# Patient Record
Sex: MALE | Race: WHITE | NOT HISPANIC OR LATINO | Employment: FULL TIME | URBAN - NONMETROPOLITAN AREA
[De-identification: names, ages, dates, MRNs, and addresses within clinical notes are randomized per-mention and may not be internally consistent; named-entity substitution may affect disease eponyms.]

---

## 2020-07-29 ENCOUNTER — HOSPITAL ENCOUNTER (EMERGENCY)
Facility: HOSPITAL | Age: 25
Discharge: HOME | End: 2020-07-29
Admitting: FAMILY MEDICINE

## 2020-07-29 ENCOUNTER — APPOINTMENT (OUTPATIENT)
Dept: GENERAL RADIOLOGY | Facility: HOSPITAL | Age: 25
End: 2020-07-29

## 2020-07-29 VITALS
HEIGHT: 68 IN | SYSTOLIC BLOOD PRESSURE: 133 MMHG | WEIGHT: 173 LBS | BODY MASS INDEX: 26.22 KG/M2 | RESPIRATION RATE: 16 BRPM | OXYGEN SATURATION: 99 % | DIASTOLIC BLOOD PRESSURE: 78 MMHG | TEMPERATURE: 98.2 F | HEART RATE: 54 BPM

## 2020-07-29 DIAGNOSIS — S46.911A STRAIN OF RIGHT SHOULDER, INITIAL ENCOUNTER: Primary | ICD-10-CM

## 2020-07-29 PROCEDURE — 73030 X-RAY EXAM OF SHOULDER: CPT

## 2020-07-29 PROCEDURE — 99283 EMERGENCY DEPT VISIT LOW MDM: CPT

## 2020-07-29 PROCEDURE — 25010000002 METHYLPREDNISOLONE PER 125 MG: Performed by: PHYSICIAN ASSISTANT

## 2020-07-29 PROCEDURE — 73030 X-RAY EXAM OF SHOULDER: CPT | Performed by: RADIOLOGY

## 2020-07-29 PROCEDURE — 96372 THER/PROPH/DIAG INJ SC/IM: CPT

## 2020-07-29 RX ORDER — METHYLPREDNISOLONE SODIUM SUCCINATE 125 MG/2ML
80 INJECTION, POWDER, LYOPHILIZED, FOR SOLUTION INTRAMUSCULAR; INTRAVENOUS ONCE
Status: COMPLETED | OUTPATIENT
Start: 2020-07-29 | End: 2020-07-29

## 2020-07-29 RX ORDER — METHYLPREDNISOLONE 4 MG/1
TABLET ORAL
Qty: 21 TABLET | Refills: 0 | Status: SHIPPED | OUTPATIENT
Start: 2020-07-29

## 2020-07-29 RX ADMIN — METHYLPREDNISOLONE SODIUM SUCCINATE 80 MG: 125 INJECTION, POWDER, FOR SOLUTION INTRAMUSCULAR; INTRAVENOUS at 21:01

## 2020-11-17 ENCOUNTER — OFFICE VISIT (OUTPATIENT)
Dept: UROLOGY | Facility: CLINIC | Age: 25
End: 2020-11-17

## 2020-11-17 VITALS — TEMPERATURE: 98.4 F | BODY MASS INDEX: 26.22 KG/M2 | WEIGHT: 173 LBS | HEIGHT: 68 IN

## 2020-11-17 DIAGNOSIS — R31.29 MICROSCOPIC HEMATURIA: ICD-10-CM

## 2020-11-17 DIAGNOSIS — N48.89 PENILE SWELLING: Primary | ICD-10-CM

## 2020-11-17 DIAGNOSIS — R10.9 BILATERAL FLANK PAIN: ICD-10-CM

## 2020-11-17 DIAGNOSIS — R10.32 BILATERAL GROIN PAIN: ICD-10-CM

## 2020-11-17 DIAGNOSIS — R30.0 DYSURIA: ICD-10-CM

## 2020-11-17 DIAGNOSIS — R10.31 BILATERAL GROIN PAIN: ICD-10-CM

## 2020-11-17 DIAGNOSIS — R21 PENILE RASH: ICD-10-CM

## 2020-11-17 LAB
BILIRUB BLD-MCNC: NEGATIVE MG/DL
CLARITY, POC: CLEAR
COLOR UR: YELLOW
GLUCOSE UR STRIP-MCNC: NEGATIVE MG/DL
KETONES UR QL: NEGATIVE
LEUKOCYTE EST, POC: NEGATIVE
NITRITE UR-MCNC: NEGATIVE MG/ML
PH UR: 7 [PH] (ref 5–8)
PROT UR STRIP-MCNC: NEGATIVE MG/DL
RBC # UR STRIP: ABNORMAL /UL
SP GR UR: 1.01 (ref 1–1.03)
UROBILINOGEN UR QL: NORMAL

## 2020-11-17 PROCEDURE — 87086 URINE CULTURE/COLONY COUNT: CPT | Performed by: NURSE PRACTITIONER

## 2020-11-17 PROCEDURE — 51798 US URINE CAPACITY MEASURE: CPT | Performed by: NURSE PRACTITIONER

## 2020-11-17 PROCEDURE — 99214 OFFICE O/P EST MOD 30 MIN: CPT | Performed by: NURSE PRACTITIONER

## 2020-11-17 RX ORDER — CLOTRIMAZOLE AND BETAMETHASONE DIPROPIONATE 10; .64 MG/G; MG/G
CREAM TOPICAL 2 TIMES DAILY PRN
Qty: 45 G | Refills: 1 | Status: SHIPPED | OUTPATIENT
Start: 2020-11-17

## 2020-11-17 RX ORDER — TAMSULOSIN HYDROCHLORIDE 0.4 MG/1
1 CAPSULE ORAL DAILY
Qty: 30 CAPSULE | Refills: 5 | Status: SHIPPED | OUTPATIENT
Start: 2020-11-17

## 2020-11-17 NOTE — PATIENT INSTRUCTIONS
"Dietary Guidelines to Help Prevent Kidney Stones  Kidney stones are deposits of minerals and salts that form inside your kidneys. Your risk of developing kidney stones may be greater depending on your diet, your lifestyle, the medicines you take, and whether you have certain medical conditions. Most people can reduce their chances of developing kidney stones by following the instructions below. Depending on your overall health and the type of kidney stones you tend to develop, your dietitian may give you more specific instructions.  What are tips for following this plan?  Reading food labels  · Choose foods with \"no salt added\" or \"low-salt\" labels. Limit your sodium intake to less than 1500 mg per day.  · Choose foods with calcium for each meal and snack. Try to eat about 300 mg of calcium at each meal. Foods that contain 200-500 mg of calcium per serving include:  ? 8 oz (237 ml) of milk, fortified nondairy milk, and fortified fruit juice.  ? 8 oz (237 ml) of kefir, yogurt, and soy yogurt.  ? 4 oz (118 ml) of tofu.  ? 1 oz of cheese.  ? 1 cup (300 g) of dried figs.  ? 1 cup (91 g) of cooked broccoli.  ? 1-3 oz can of sardines or mackerel.  · Most people need 1000 to 1500 mg of calcium each day. Talk to your dietitian about how much calcium is recommended for you.  Shopping  · Buy plenty of fresh fruits and vegetables. Most people do not need to avoid fruits and vegetables, even if they contain nutrients that may contribute to kidney stones.  · When shopping for convenience foods, choose:  ? Whole pieces of fruit.  ? Premade salads with dressing on the side.  ? Low-fat fruit and yogurt smoothies.  · Avoid buying frozen meals or prepared deli foods.  · Look for foods with live cultures, such as yogurt and kefir.  Cooking  · Do not add salt to food when cooking. Place a salt shaker on the table and allow each person to add his or her own salt to taste.  · Use vegetable protein, such as beans, textured vegetable " protein (TVP), or tofu instead of meat in pasta, casseroles, and soups.  Meal planning    · Eat less salt, if told by your dietitian. To do this:  ? Avoid eating processed or premade food.  ? Avoid eating fast food.  · Eat less animal protein, including cheese, meat, poultry, or fish, if told by your dietitian. To do this:  ? Limit the number of times you have meat, poultry, fish, or cheese each week. Eat a diet free of meat at least 2 days a week.  ? Eat only one serving each day of meat, poultry, fish, or seafood.  ? When you prepare animal protein, cut pieces into small portion sizes. For most meat and fish, one serving is about the size of one deck of cards.  · Eat at least 5 servings of fresh fruits and vegetables each day. To do this:  ? Keep fruits and vegetables on hand for snacks.  ? Eat 1 piece of fruit or a handful of berries with breakfast.  ? Have a salad and fruit at lunch.  ? Have two kinds of vegetables at dinner.  · Limit foods that are high in a substance called oxalate. These include:  ? Spinach.  ? Rhubarb.  ? Beets.  ? Potato chips and french fries.  ? Nuts.  · If you regularly take a diuretic medicine, make sure to eat at least 1-2 fruits or vegetables high in potassium each day. These include:  ? Avocado.  ? Banana.  ? Orange, prune, carrot, or tomato juice.  ? Baked potato.  ? Cabbage.  ? Beans and split peas.  General instructions    · Drink enough fluid to keep your urine clear or pale yellow. This is the most important thing you can do.  · Talk to your health care provider and dietitian about taking daily supplements. Depending on your health and the cause of your kidney stones, you may be advised:  ? Not to take supplements with vitamin C.  ? To take a calcium supplement.  ? To take a daily probiotic supplement.  ? To take other supplements such as magnesium, fish oil, or vitamin B6.  · Take all medicines and supplements as told by your health care provider.  · Limit alcohol intake to no  more than 1 drink a day for nonpregnant women and 2 drinks a day for men. One drink equals 12 oz of beer, 5 oz of wine, or 1½ oz of hard liquor.  · Lose weight if told by your health care provider. Work with your dietitian to find strategies and an eating plan that works best for you.  What foods are not recommended?  Limit your intake of the following foods, or as told by your dietitian. Talk to your dietitian about specific foods you should avoid based on the type of kidney stones and your overall health.  Grains  Breads. Bagels. Rolls. Baked goods. Salted crackers. Cereal. Pasta.  Vegetables  Spinach. Rhubarb. Beets. Canned vegetables. Pickles. Olives.  Meats and other protein foods  Nuts. Nut butters. Large portions of meat, poultry, or fish. Salted or cured meats. Deli meats. Hot dogs. Sausages.  Dairy  Cheese.  Beverages  Regular soft drinks. Regular vegetable juice.  Seasonings and other foods  Seasoning blends with salt. Salad dressings. Canned soups. Soy sauce. Ketchup. Barbecue sauce. Canned pasta sauce. Casseroles. Pizza. Lasagna. Frozen meals. Potato chips. French fries.  Summary  · You can reduce your risk of kidney stones by making changes to your diet.  · The most important thing you can do is drink enough fluid. You should drink enough fluid to keep your urine clear or pale yellow.  · Ask your health care provider or dietitian how much protein from animal sources you should eat each day, and also how much salt and calcium you should have each day.  This information is not intended to replace advice given to you by your health care provider. Make sure you discuss any questions you have with your health care provider.  Document Released: 04/13/2012 Document Revised: 04/08/2020 Document Reviewed: 11/28/2017  [employer] Patient Education © 2020 [employer]      Balanitis    Balanitis is swelling and irritation (inflammation) of the head of the penis (glans penis). The condition may also cause  inflammation of the skin around the glans penis (foreskin) in men who have not been circumcised. It may develop because of an infection or another medical condition.  Balanitis occurs most often among men who have not had their foreskin removed (uncircumcised men). Balanitis sometimes causes scarring of the penis or foreskin, which can require surgery. Untreated balanitis can increase the risk of penile cancer.  What are the causes?  Common causes of this condition include:  · Poor personal hygiene, especially in uncircumcised men. Not cleaning the glans penis and foreskin well can result in buildup of bacteria, viruses, and yeast, which can lead to infection and inflammation.  · Irritation and lack of air flow due to fluid (smegma) that can build up on the glans penis.  Other causes include:  · Chemical irritation from products such as soaps or shower gels (especially those that have fragrance), condoms, personal lubricants, petroleum jelly, spermicides, or fabric softeners.  · Skin conditions, such as eczema, dermatitis, and psoriasis.  · Allergies to medicines, such as tetracycline and sulfa drugs.  · Certain medical conditions, including liver cirrhosis, congestive heart failure, diabetes, and kidney disease.  · Infections, such as candidiasis, HPV (human papillomavirus), herpes simplex, gonorrhea, and syphilis.  · Severe obesity.  What increases the risk?  The following factors may make you more likely to develop this condition:  · Having diabetes. This is the most common risk factor.  · Having a tight foreskin that is difficult to pull back (retract) past the glans.  · Having sexual intercourse without using a condom.  What are the signs or symptoms?  Symptoms of this condition include:  · Discharge from under the foreskin.  · A bad smell.  · Pain or difficulty retracting the foreskin.  · Tenderness, redness, and swelling of the glans.  · A rash or sores on the glans or foreskin.  · Itchiness.  · Inability to  get an erection due to pain.  · Difficulty urinating.  · Scarring of the penis or foreskin, in some cases.  How is this diagnosed?  This condition may be diagnosed based on:  · A physical exam.  · Testing a swab of discharge to check for bacterial or fungal infection.  · Blood tests:  ? To check for viruses that can cause balanitis.  ? To check your blood sugar (glucose) level. High blood glucose could be a sign of diabetes, which can cause balanitis.  How is this treated?  Treatment for balanitis depends on the cause. Treatment may include:  · Improving personal hygiene. Your health care provider may recommend sitting in a bath of warm water that is deep enough to cover your hips and buttocks (sitz bath).  · Medicines such as:  ? Creams or ointments to reduce swelling (steroids) or to treat an infection.  ? Antibiotic medicine.  ? Antifungal medicine.  · Surgery to remove or cut the foreskin (circumcision). This may be done if you have scarring on the foreskin that makes it difficult to retract.  · Controlling other medical problems that may be causing your condition or making it worse.  Follow these instructions at home:  · Do not have sex until the condition clears up, or until your health care provider approves.  · Keep your penis clean and dry. Take sitz baths as recommended by your health care provider.  · Avoid products that irritate your skin or make symptoms worse, such as soaps and shower gels that have fragrance.  · Take over-the-counter and prescription medicines only as told by your health care provider.  ? If you were prescribed an antibiotic medicine or a cream or ointment, use it as told by your health care provider. Do not stop using your medicine, cream, or ointment even if you start to feel better.  ? Do not drive or use heavy machinery while taking prescription pain medicine.  Contact a health care provider if:  · Your symptoms get worse or do not improve with home care.  · You develop chills or  a fever.  · You have trouble urinating.  · You cannot retract your foreskin.  Get help right away if:  · You develop severe pain.  · You are unable to urinate.  Summary  · Balanitis is inflammation of the head of the penis (glans penis) caused by irritation or infection.  · Balanitis causes pain, redness, and swelling of the glans penis.  · This condition is most common among uncircumcised men who do not keep their glans penis clean and in men who have diabetes.  · Treatment may include creams or ointments.  · Good hygiene is important for prevention. This includes pulling back the foreskin when washing your penis.  This information is not intended to replace advice given to you by your health care provider. Make sure you discuss any questions you have with your health care provider.  Document Released: 05/06/2010 Document Revised: 11/30/2018 Document Reviewed: 11/06/2017  [employer] Patient Education © 2020 [employer]

## 2020-11-17 NOTE — PROGRESS NOTES
Chief Complaint:          Chief Complaint   Patient presents with   • Groin Swelling/Left Scrotal Pain     PENILE REDNESS/Irritation/Abdominal/Pelvic Pain       HPI:   25 y.o. male.  Very pleasant patient evaluated in clinic today.    He has been referred to us by his primary care Mr. Christopher Nelson PA-C from [facility] for penile redness and swelling.  Patient reports a gradual onset with redness and penile rash prominent post sexual intercourse that was intermittent, but now persistent lasting 2 to 3 days each time.      He reports dysuria, urine hesitancy, pelvic pain and suprapubic discomfort, but denies urine frequency, urgency, nocturia or any burning on urination.  Denies any penile discharge, flank pain, CVA tenderness.  He has not had episodes of gross hematuria, any chills or fevers, denies N/V/D.    The patient denies any kind of penile trauma.  He is sexually active in a monogamous relationship and denies any new sexual contacts, or lubricants.  His recent STD test is completely negative for any infections, his urine dipstick is completely negative for any infection, he has 3+ microscopic hematuria.  His IPSS score is 3    He is relatively healthy with no significant medical problems.      Past Medical History:      History reviewed. No pertinent past medical history.    The following portions of the patient's history were reviewed and updated as appropriate: allergies, current medications, past family history, past medical history, past social history, past surgical history and problem list.    Current Meds:     Current Outpatient Medications   Medication Sig Dispense Refill   • clotrimazole-betamethasone (LOTRISONE) 1-0.05 % cream Apply  topically to the appropriate area as directed 2 (Two) Times a Day As Needed (penile rash). 45 g 1   • methylPREDNISolone (MEDROL, MELANIE,) 4 MG tablet Take as directed on package instructions. 21 tablet 0   • tamsulosin (FLOMAX) 0.4 MG capsule 24 hr capsule  Take 1 capsule by mouth Daily. 30 capsule 5     No current facility-administered medications for this visit.         Allergies:      Allergies   Allergen Reactions   • Bactrim [Sulfamethoxazole-Trimethoprim] Other (See Comments)     Nose bleeds   • Ceclor [Cefaclor] Other (See Comments)     Nose bleeds        Past Surgical History:     History reviewed. No pertinent surgical history.      Social History:     Social History     Socioeconomic History   • Marital status: Single     Spouse name: Not on file   • Number of children: Not on file   • Years of education: Not on file   • Highest education level: Not on file       Family History:     History reviewed. No pertinent family history.    Review of Systems:     Review of Systems   Constitutional: Negative for activity change, appetite change, chills, fatigue and fever.   HENT: Negative for congestion and sinus pressure.    Eyes: Negative for blurred vision and double vision.   Respiratory: Negative for shortness of breath and wheezing ( left testicle pain).    Gastrointestinal: Negative for abdominal pain, constipation, diarrhea, nausea and vomiting.   Genitourinary: Positive for dysuria, hematuria, penile pain and testicular pain. Negative for difficulty urinating, discharge, flank pain, frequency, genital sores, penile swelling, scrotal swelling, urgency and urinary incontinence.   Musculoskeletal: Negative for back pain.   Neurological: Negative for dizziness, weakness and confusion.   Psychiatric/Behavioral: Positive for stress. Negative for agitation, behavioral problems and decreased concentration. The patient is nervous/anxious.       IPSS Questionnaire (AUA-7):  Over the past month…    1)  How often have you had a sensation of not emptying your bladder completely after you finish urinating?  1 - Less than 1 time in 5   2)  How often have you had to urinate again less than two hours after you finished urinating? 1 - Less than 1 time in 5   3)  How often have  you found you stopped and started again several times when you urinated?  0 - Not at all   4) How difficult have you found it to postpone urination?  0 - Not at all   5) How often have you had a weak urinary stream?  1 - Less than 1 time in 5   6) How often have you had to push or strain to begin urination?  0 - Not at all   7) How many times did you most typically get up to urinate from the time you went to bed until the time you got up in the morning?  0 - None   Total score:  0-7 mildly symptomatic                                                             3    8-19 moderately symptomatic    20-35 severely symptomatic       Physical Exam:     Physical Exam  Constitutional:       General: He is not in acute distress.     Appearance: He is well-developed.   HENT:      Head: Normocephalic and atraumatic.      Right Ear: External ear normal.      Left Ear: External ear normal.   Eyes:      General:         Right eye: No discharge.         Left eye: No discharge.      Conjunctiva/sclera: Conjunctivae normal.      Pupils: Pupils are equal, round, and reactive to light.   Neck:      Musculoskeletal: Normal range of motion and neck supple.      Thyroid: No thyromegaly.      Trachea: No tracheal deviation.   Cardiovascular:      Rate and Rhythm: Normal rate and regular rhythm.      Heart sounds: No murmur. No friction rub.   Pulmonary:      Effort: Pulmonary effort is normal. No respiratory distress.      Breath sounds: Normal breath sounds. No stridor.   Abdominal:      General: Bowel sounds are normal. There is no distension.      Palpations: Abdomen is soft.      Tenderness: There is abdominal tenderness. There is no guarding.   Genitourinary:     Penis: Normal and uncircumcised. No tenderness or discharge.       Scrotum/Testes: Normal.      Rectum: Normal. Guaiac result negative.      Comments: Normal male Genitalia without any penile redness or irritation visible .RESOLVED. GLANS PENIS/FORESKIN INTACT.  Left  testicular pain/tenderness otherwise normal external genitalia. Bilateral descended testes without any masses, left slightly swollen and hanging lower than the right testicles. There are no inguinal hernias or abnormalities noted.     Musculoskeletal: Normal range of motion.         General: Tenderness present. No deformity.   Skin:     General: Skin is warm and dry.      Capillary Refill: Capillary refill takes less than 2 seconds.      Coloration: Skin is not pale.   Neurological:      Mental Status: He is alert and oriented to person, place, and time.      Cranial Nerves: No cranial nerve deficit.      Coordination: Coordination normal.   Psychiatric:         Behavior: Behavior normal.         Thought Content: Thought content normal.         Judgment: Judgment normal.         Procedure:       Assessment/Plan:         ASSESSMENT  Penile Swelling/Redness/Groin Pain: Left Scrotal /Groin Pain: Patient presents with penile irritation, swelling, left scrotal pain and discomfort that has been ongoing for a few weeks and gradually becoming very bothersome to him.  He reports initially this did not hurt at all, but over the weeks, the irritation appears to be re-occuring and is becoming very concerning to him.      He also feels a lot of pain and discomfort  In his pelvic/suprapubic area .His scrotal exam is unremarkable , with no  significant swelling/ Enlargement of his scrotum.  Although no redness or irritation is present, patient reports a feeling of pressure at the base of his penis.  Urine dipstick is complete negative for any infection, he has 3+ microscopic hematuria.      I discussed this case with Dr. Boucher, who also reexamined the patient and is agreeable with the plan of care.         PLAN    WE sent his urine for culture, I will call him with results of any positive bacterial growth.  Patient was treated with Zithromax 2000 mg x 1 dose -His primary care.    We discussed alpha blockade, patient has been  started on Flomax 0.4 mg daily-Hesitancy/dysuria    Lotrisone cream: Apply to penile redness/irritation as needed-Balanitis, discussed perineal hygiene and to avoid irritants such as in certain soaps, lubricants.    We discussed on upper tract investigation, patient has been scheduled for CT abdomen and pelvis with and without contrast-Hematuria    We will see him in 2 weeks for follow-up, and review his CT scan report at that time.  He is agreeable to this plan    Patient reports that he is not currently experiencing any symptoms of urinary incontinence.    Patient's Body mass index is 26.3 kg/m². BMI is within normal parameters. No follow-up required..    Smoking Cessation Counseling:  Never a smoker.  Patient does not currently use any tobacco products.       Counseling was given to patient for the following topics diagnostic results including: Penile swelling/irritation, groin pain, left epididymal cyst., instructions for management as follows: Increase p.o. fluid intake, Lotrisone cream as needed, Flomax 0.4 mg daily., risk factor reductions including: Bladder irritants such as caffeine products, coffee, tea, citrusy/spicy foods. and impressions as follows: Scheduled for scrotal ultrasound.. The interim medical history and current results were reviewed.  A treatment plan with follow-up was made for Penile swelling [N48.89].         This document has been electronically signed by Griselda Cheng-Akwa, APRN November 19, 2020 15:09 EST

## 2020-11-18 LAB — BACTERIA SPEC AEROBE CULT: NO GROWTH

## 2020-11-24 ENCOUNTER — HOSPITAL ENCOUNTER (OUTPATIENT)
Dept: CT IMAGING | Facility: HOSPITAL | Age: 25
Discharge: HOME | End: 2020-11-24
Admitting: NURSE PRACTITIONER

## 2020-11-24 DIAGNOSIS — R10.32 BILATERAL GROIN PAIN: ICD-10-CM

## 2020-11-24 DIAGNOSIS — R31.29 MICROSCOPIC HEMATURIA: ICD-10-CM

## 2020-11-24 DIAGNOSIS — R10.31 BILATERAL GROIN PAIN: ICD-10-CM

## 2020-11-24 DIAGNOSIS — R10.9 BILATERAL FLANK PAIN: ICD-10-CM

## 2020-11-24 PROCEDURE — 74176 CT ABD & PELVIS W/O CONTRAST: CPT | Performed by: RADIOLOGY

## 2020-11-24 PROCEDURE — 74176 CT ABD & PELVIS W/O CONTRAST: CPT

## 2020-12-01 ENCOUNTER — OFFICE VISIT (OUTPATIENT)
Dept: UROLOGY | Facility: CLINIC | Age: 25
End: 2020-12-01

## 2020-12-01 VITALS — BODY MASS INDEX: 26.23 KG/M2 | WEIGHT: 173.06 LBS | HEIGHT: 68 IN | TEMPERATURE: 98.4 F

## 2020-12-01 DIAGNOSIS — R10.32 BILATERAL GROIN PAIN: Primary | ICD-10-CM

## 2020-12-01 DIAGNOSIS — R11.0 NAUSEA WITHOUT VOMITING: ICD-10-CM

## 2020-12-01 DIAGNOSIS — R10.31 BILATERAL GROIN PAIN: Primary | ICD-10-CM

## 2020-12-01 PROCEDURE — 99214 OFFICE O/P EST MOD 30 MIN: CPT | Performed by: NURSE PRACTITIONER

## 2020-12-01 RX ORDER — PROMETHAZINE HYDROCHLORIDE 25 MG/1
25 TABLET ORAL EVERY 6 HOURS PRN
Qty: 21 TABLET | Refills: 0 | Status: SHIPPED | OUTPATIENT
Start: 2020-12-01 | End: 2020-12-01 | Stop reason: SDUPTHER

## 2020-12-01 RX ORDER — PROMETHAZINE HYDROCHLORIDE 25 MG/1
25 TABLET ORAL EVERY 6 HOURS PRN
Qty: 21 TABLET | Refills: 0 | Status: SHIPPED | OUTPATIENT
Start: 2020-12-01

## 2020-12-01 NOTE — PROGRESS NOTES
Chief Complaint:          Chief Complaint   Patient presents with   • Groin Pain with Penile Swelling/ Redness /Abdominal Pain     2 Week Follow Up / Review CT       HPI:   25 y.o. male.  Very pleasant patient, relatively healthy with no significant medical problems, seen 2 weeks ago with multiple complaints for groin pain, penile redness and swelling, lower abdominal pain with significant pelvic/suprapubic discomfort, and urinary symptoms returns to clinic today for follow-up.    He is here to review his CT scan results which shows that there is no focal bladder mass , or significant bladder wall thickening consistent with his symptoms.  The CT however shows  moderate to large volumes of stool in his colon.    Patient reports minimal improvement in his prior symptoms as his groin pain and discomfort is persistent even though improved urinary symptoms since starting Flomax. HIS IPSS SCORE IS 6.    Nevertheless, he reports constant lower abdominal pain and discomfort on a daily basis, worse in the morning when waking up.  He reports significant nausea, abdominal bloating, and symptoms consistent with GERD.    We discussed scrotal/testicular ultrasound,    We discussed GI referral at this point.      Past Medical History:      History reviewed. No pertinent past medical history.    The following portions of the patient's history were reviewed and updated as appropriate: allergies, current medications, past family history, past medical history, past social history, past surgical history and problem list.    Current Meds:     Current Outpatient Medications   Medication Sig Dispense Refill   • clotrimazole-betamethasone (LOTRISONE) 1-0.05 % cream Apply  topically to the appropriate area as directed 2 (Two) Times a Day As Needed (penile rash). 45 g 1   • methylPREDNISolone (MEDROL, MELANIE,) 4 MG tablet Take as directed on package instructions. 21 tablet 0   • promethazine (PHENERGAN) 25 MG tablet Take 1 tablet by mouth Every  6 (Six) Hours As Needed for Nausea or Vomiting. 21 tablet 0   • tamsulosin (FLOMAX) 0.4 MG capsule 24 hr capsule Take 1 capsule by mouth Daily. 30 capsule 5     No current facility-administered medications for this visit.         Allergies:      Allergies   Allergen Reactions   • Bactrim [Sulfamethoxazole-Trimethoprim] Other (See Comments)     Nose bleeds   • Ceclor [Cefaclor] Other (See Comments)     Nose bleeds        Past Surgical History:     History reviewed. No pertinent surgical history.      Social History:     Social History     Socioeconomic History   • Marital status: Single     Spouse name: Not on file   • Number of children: Not on file   • Years of education: Not on file   • Highest education level: Not on file       Family History:     History reviewed. No pertinent family history.    Review of Systems:     Review of Systems   Constitutional: Positive for activity change and fatigue. Negative for appetite change, chills and fever.   HENT: Negative for congestion and sinus pressure.    Eyes: Negative for blurred vision and double vision.   Respiratory: Negative for shortness of breath and wheezing.    Gastrointestinal: Positive for abdominal pain, constipation, nausea, GERD and indigestion. Negative for diarrhea and vomiting.   Genitourinary: Positive for difficulty urinating, dysuria, penile swelling and testicular pain. Negative for discharge, flank pain, frequency, genital sores, hematuria, penile pain, scrotal swelling, urgency and urinary incontinence.   Musculoskeletal: Positive for myalgias. Negative for back pain.   Neurological: Negative for dizziness, weakness and confusion.   Psychiatric/Behavioral: Positive for sleep disturbance and stress. Negative for agitation, behavioral problems and decreased concentration.      IPSS Questionnaire (AUA-7):  Over the past month…    1)  How often have you had a sensation of not emptying your bladder completely after you finish urinating?  1 - Less than 1  time in 5   2)  How often have you had to urinate again less than two hours after you finished urinating? 1 - Less than 1 time in 5   3)  How often have you found you stopped and started again several times when you urinated?  2 - Less than half the time   4) How difficult have you found it to postpone urination?  0 - Not at all   5) How often have you had a weak urinary stream?  0 - Not at all   6) How often have you had to push or strain to begin urination?  0 - Not at all   7) How many times did you most typically get up to urinate from the time you went to bed until the time you got up in the morning?  2 - 2 times   Total score:  0-7 mildly symptomatic                                                        6    8-19 moderately symptomatic    20-35 severely symptomatic       Physical Exam:     Physical Exam  Constitutional:       General: He is not in acute distress.     Appearance: He is well-developed.   HENT:      Head: Normocephalic and atraumatic.      Right Ear: External ear normal.      Left Ear: External ear normal.   Eyes:      General:         Right eye: No discharge.         Left eye: No discharge.      Conjunctiva/sclera: Conjunctivae normal.      Pupils: Pupils are equal, round, and reactive to light.   Neck:      Musculoskeletal: Normal range of motion and neck supple.      Thyroid: No thyromegaly.      Trachea: No tracheal deviation.   Cardiovascular:      Rate and Rhythm: Normal rate and regular rhythm.      Heart sounds: No murmur. No friction rub.   Pulmonary:      Effort: Pulmonary effort is normal. No respiratory distress.      Breath sounds: Normal breath sounds. No stridor.   Abdominal:      General: Bowel sounds are normal. There is no distension.      Palpations: Abdomen is soft.      Tenderness: There is abdominal tenderness. There is guarding.   Genitourinary:     Penis: Normal and uncircumcised. No tenderness or discharge.       Scrotum/Testes: Normal.      Rectum: Normal. Guaiac result  negative.   Musculoskeletal: Normal range of motion.         General: Tenderness present. No deformity.   Skin:     General: Skin is warm and dry.      Capillary Refill: Capillary refill takes less than 2 seconds.      Coloration: Skin is not pale.   Neurological:      Mental Status: He is alert and oriented to person, place, and time.      Cranial Nerves: No cranial nerve deficit.      Coordination: Coordination normal.   Psychiatric:         Behavior: Behavior normal.         Thought Content: Thought content normal.         Judgment: Judgment normal.       Procedure:       Assessment/Plan:         ASSESSMENT  Groin Pain /Penile swelling /Redness /Abdominal Pain: Very pleasant male evaluated in clinic today.  Reports significant improvement in his urinary symptoms since starting Flomax.  He denies any recurrence of his penile swelling and redness.  However patient reports he continues to have groin pain, pelvic pain and discomfort. He reports lower abdominal pain that has been persistent, now causing significant nausea and discomfort on a daily basis.    We both reviwed and discussed his CT scan results which shows that there is no focal bladder mass, or significant bladder wall thickening consistent with his symptoms. The CT however shows  moderate to large volumes of stool in his colon.     Patient reports GI issues since childhood. He has significant irritable bowel syndrome, characterized by extreme amounts of constipation.  We spoke about the impact of this on bladder function.  We spoke about  its relationship to recurrent urinary symptoms.      We discussed the need for increasing p.o. fluid intake to at least 2 to 3 L of water daily, and discussed the physiology of colonic motility as well as use of MiraLAX as a bulk laxative versus the newer class of serotonin uptake blockers such as Linzess.  We stressed the need for a daily bowel movement and discussed the [place] stool scale at length.        PLAN      We also discussed a referral to the GI nurse practitioner    We discussed getting a Scrotal / Testicular ultrasound -Groin Pain    Continue Flomax daily as discussed, -Dysuria    Continue Lotrisone Cream PRN-Penile redness/irritation    We will see patient in follow-up and review scrotal ultrasound results.    Patient is agreeable plan of care.    Patient reports that he is not currently experiencing any symptoms of urinary incontinence.    Patient's Body mass index is 26.32 kg/m². BMI is within normal parameters. No follow-up required..    Smoking Cessation Counseling:  Never a smoker.  Patient does not currently use any tobacco products.     Counseling was given to patient for the following topics diagnostic results including: Penile swelling/irritation, groin pain, left epididymal cyst., instructions for management as follows: Increase p.o. fluid intake, Lotrisone cream as needed, Flomax 0.4 mg daily., risk factor reductions including: Bladder irritants such as caffeine products, coffee, tea, citrusy/spicy foods. and impressions as follows: Scheduled for scrotal ultrasound.. The interim medical history and current results were reviewed.  A treatment plan with follow-up was made for ABDOMINAL/PELVIC PAIN,  Penile swelling, Bilateral groin pain [R10.31, R10.32].         This document has been electronically signed by Griselda Cheng-Akwa, APRN December 1, 2020 19:51 EST

## 2020-12-02 NOTE — PATIENT INSTRUCTIONS
Adductor Muscle Strain With Rehab  Ask your health care provider which exercises are safe for you. Do exercises exactly as told by your health care provider and adjust them as directed. It is normal to feel mild stretching, pulling, tightness, or mild discomfort as you do these exercises. Stop right away if you feel sudden pain or your pain gets worse. Do not begin these exercises until told by your health care provider.  Strengthening exercises  These exercises build strength and endurance in your thighs. Endurance is the ability to use your muscles for a long time, even after your muscles get tired.  Hip adductor isometrics    This exercise is sometimes called inner thigh squeeze.  1. Sit on a firm chair that positions your knees at about the same height as your hips.  2. Place a large ball, firm pillow, or rolled-up bath towel between your thighs.  3. Squeeze your thighs together, gradually building tension.  4. Hold for __________ seconds.  5. Release the tension gradually. Allow your inner thigh muscles to relax completely before you start the next repetition.  Repeat __________ times. Complete this exercise __________ times a day.  Hip adduction    This exercise is sometimes called side lying straight leg raises.  1. Lie on your side so your head, shoulder, knee, and hip are in a straight line with each other. To help maintain your balance, you may put the foot of your top leg in front of the leg that is on the floor. Your left / right leg should be on the bottom.  2. Roll your hips slightly forward so your hips are stacked directly over each other and your left / right knee is facing forward.  3. Tense the muscles of your inner thigh and lift your bottom leg 4-6 inches (10-15 cm).  4. Hold this position for __________ seconds.  5. Slowly lower your leg to the starting position.  6. Allow your muscles to relax completely before you start the next repetition.  Repeat __________ times. Complete this exercise  __________ times a day.  Hip extension    This exercise is sometimes called prone (on your belly) straight leg raises.  1. Lie on your belly on a bed or a firm surface with a pillow under your hips.  2. Tense your buttock muscles and lift your left / right thigh off the bed. Your left / right knee can be bent or straight, but do not let your back arch.  3. Hold this position for __________ seconds.  4. Slowly return to the starting position.  5. Allow your muscles to relax completely before you start the next repetition.  Repeat __________ times. Complete this exercise __________ times a day.  Balance exercises  These exercises improve or maintain your balance. Balance is important in preventing falls.  Single-leg balance  1. Stand near a railing or by a door frame that you can hold onto as needed.  2. Stand on your left / right foot. Keep your big toe down on the floor and try to keep your arch lifted.  3. If this is too easy, you can stand with your eyes closed or stand on a pillow.  4. Hold this position for __________ seconds.  Repeat __________ times. Complete this exercise __________ times a day.  Side lunges  1. Stand with your feet together.  2. Keeping one foot in place, step to the side with your other foot about __________ inches (__________ cm). Do not step so far that you feel discomfort in your middle thigh.  3. Push off from your stepping foot to return to the starting position.  Repeat __________ times. Complete this exercise __________ times a day.  This information is not intended to replace advice given to you by your health care provider. Make sure you discuss any questions you have with your health care provider.  Document Revised: 04/07/2020 Document Reviewed: 09/17/2019  Elsevier Patient Education © 2020 [employer]  Abdominal Pain, Adult  Many things can cause belly (abdominal) pain. Most times, belly pain is not dangerous. Many cases of belly pain can be watched and treated at home.  Sometimes, though, belly pain is serious. Your doctor will try to find the cause of your belly pain.  Follow these instructions at home:    Medicines  · Take over-the-counter and prescription medicines only as told by your doctor.  · Do not take medicines that help you poop (laxatives) unless told by your doctor.  General instructions  · Watch your belly pain for any changes.  · Drink enough fluid to keep your pee (urine) pale yellow.  · Keep all follow-up visits as told by your doctor. This is important.  Contact a doctor if:  · Your belly pain changes or gets worse.  · You are not hungry, or you lose weight without trying.  · You are having trouble pooping (constipated) or have watery poop (diarrhea) for more than 2-3 days.  · You have pain when you pee or poop.  · Your belly pain wakes you up at night.  · Your pain gets worse with meals, after eating, or with certain foods.  · You are vomiting and cannot keep anything down.  · You have a fever.  · You have blood in your pee.  Get help right away if:  · Your pain does not go away as soon as your doctor says it should.  · You cannot stop vomiting.  · Your pain is only in areas of your belly, such as the right side or the left lower part of the belly.  · You have bloody or black poop, or poop that looks like tar.  · You have very bad pain, cramping, or bloating in your belly.  · You have signs of not having enough fluid or water in your body (dehydration), such as:  ? Dark pee, very little pee, or no pee.  ? Cracked lips.  ? Dry mouth.  ? Sunken eyes.  ? Sleepiness.  ? Weakness.  · You have trouble breathing or chest pain.  Summary  · Many cases of belly pain can be watched and treated at home.  · Watch your belly pain for any changes.  · Take over-the-counter and prescription medicines only as told by your doctor.  · Contact a doctor if your belly pain changes or gets worse.  · Get help right away if you have very bad pain, cramping, or bloating in your  belly.  This information is not intended to replace advice given to you by your health care provider. Make sure you discuss any questions you have with your health care provider.  Document Revised: 04/27/2020 Document Reviewed: 04/27/2020  Elsevier Patient Education © 2020 [employer] Inc[employer]

## 2024-09-05 ENCOUNTER — HOSPITAL ENCOUNTER (EMERGENCY)
Facility: HOSPITAL | Age: 29
Discharge: HOME | End: 2024-09-05
Attending: STUDENT IN AN ORGANIZED HEALTH CARE EDUCATION/TRAINING PROGRAM

## 2024-09-05 ENCOUNTER — APPOINTMENT (OUTPATIENT)
Dept: ULTRASOUND IMAGING | Facility: HOSPITAL | Age: 29
End: 2024-09-05

## 2024-09-05 VITALS
HEART RATE: 89 BPM | SYSTOLIC BLOOD PRESSURE: 132 MMHG | BODY MASS INDEX: 21.22 KG/M2 | OXYGEN SATURATION: 100 % | RESPIRATION RATE: 18 BRPM | TEMPERATURE: 98.6 F | WEIGHT: 140 LBS | DIASTOLIC BLOOD PRESSURE: 77 MMHG | HEIGHT: 68 IN

## 2024-09-05 DIAGNOSIS — R11.2 NAUSEA AND VOMITING, UNSPECIFIED VOMITING TYPE: Primary | ICD-10-CM

## 2024-09-05 LAB
ALBUMIN SERPL-MCNC: 4.9 G/DL (ref 3.5–5.2)
ALBUMIN/GLOB SERPL: 1.8 G/DL
ALP SERPL-CCNC: 48 U/L (ref 39–117)
ALT SERPL W P-5'-P-CCNC: 17 U/L (ref 1–41)
ANION GAP SERPL CALCULATED.3IONS-SCNC: 10.2 MMOL/L (ref 5–15)
AST SERPL-CCNC: 15 U/L (ref 1–40)
BASOPHILS # BLD AUTO: 0.09 10*3/MM3 (ref 0–0.2)
BASOPHILS NFR BLD AUTO: 1.5 % (ref 0–1.5)
BILIRUB SERPL-MCNC: 0.6 MG/DL (ref 0–1.2)
BILIRUB UR QL STRIP: NEGATIVE
BUN SERPL-MCNC: 14 MG/DL (ref 6–20)
BUN/CREAT SERPL: 14.1 (ref 7–25)
CALCIUM SPEC-SCNC: 9.8 MG/DL (ref 8.6–10.5)
CHLORIDE SERPL-SCNC: 104 MMOL/L (ref 98–107)
CLARITY UR: CLEAR
CO2 SERPL-SCNC: 27.8 MMOL/L (ref 22–29)
COLOR UR: YELLOW
CREAT SERPL-MCNC: 0.99 MG/DL (ref 0.76–1.27)
CRP SERPL-MCNC: <0.3 MG/DL (ref 0–0.5)
DEPRECATED RDW RBC AUTO: 39.5 FL (ref 37–54)
EGFRCR SERPLBLD CKD-EPI 2021: 106.4 ML/MIN/1.73
EOSINOPHIL # BLD AUTO: 0.02 10*3/MM3 (ref 0–0.4)
EOSINOPHIL NFR BLD AUTO: 0.3 % (ref 0.3–6.2)
ERYTHROCYTE [DISTWIDTH] IN BLOOD BY AUTOMATED COUNT: 12.3 % (ref 12.3–15.4)
GLOBULIN UR ELPH-MCNC: 2.8 GM/DL
GLUCOSE SERPL-MCNC: 100 MG/DL (ref 65–99)
GLUCOSE UR STRIP-MCNC: NEGATIVE MG/DL
HCT VFR BLD AUTO: 44.8 % (ref 37.5–51)
HGB BLD-MCNC: 14.8 G/DL (ref 13–17.7)
HGB UR QL STRIP.AUTO: NEGATIVE
HOLD SPECIMEN: NORMAL
HOLD SPECIMEN: NORMAL
IMM GRANULOCYTES # BLD AUTO: 0.01 10*3/MM3 (ref 0–0.05)
IMM GRANULOCYTES NFR BLD AUTO: 0.2 % (ref 0–0.5)
KETONES UR QL STRIP: ABNORMAL
LEUKOCYTE ESTERASE UR QL STRIP.AUTO: NEGATIVE
LIPASE SERPL-CCNC: 18 U/L (ref 13–60)
LYMPHOCYTES # BLD AUTO: 1.81 10*3/MM3 (ref 0.7–3.1)
LYMPHOCYTES NFR BLD AUTO: 29.7 % (ref 19.6–45.3)
MCH RBC QN AUTO: 28.7 PG (ref 26.6–33)
MCHC RBC AUTO-ENTMCNC: 33 G/DL (ref 31.5–35.7)
MCV RBC AUTO: 87 FL (ref 79–97)
MONOCYTES # BLD AUTO: 0.35 10*3/MM3 (ref 0.1–0.9)
MONOCYTES NFR BLD AUTO: 5.7 % (ref 5–12)
NEUTROPHILS NFR BLD AUTO: 3.81 10*3/MM3 (ref 1.7–7)
NEUTROPHILS NFR BLD AUTO: 62.6 % (ref 42.7–76)
NITRITE UR QL STRIP: NEGATIVE
NRBC BLD AUTO-RTO: 0 /100 WBC (ref 0–0.2)
PH UR STRIP.AUTO: 7.5 [PH] (ref 5–8)
PLATELET # BLD AUTO: 204 10*3/MM3 (ref 140–450)
PMV BLD AUTO: 10.5 FL (ref 6–12)
POTASSIUM SERPL-SCNC: 4.5 MMOL/L (ref 3.5–5.2)
PROT SERPL-MCNC: 7.7 G/DL (ref 6–8.5)
PROT UR QL STRIP: ABNORMAL
RBC # BLD AUTO: 5.15 10*6/MM3 (ref 4.14–5.8)
SODIUM SERPL-SCNC: 142 MMOL/L (ref 136–145)
SP GR UR STRIP: 1.02 (ref 1–1.03)
UROBILINOGEN UR QL STRIP: ABNORMAL
WBC NRBC COR # BLD AUTO: 6.09 10*3/MM3 (ref 3.4–10.8)
WHOLE BLOOD HOLD COAG: NORMAL
WHOLE BLOOD HOLD SPECIMEN: NORMAL

## 2024-09-05 PROCEDURE — 76705 ECHO EXAM OF ABDOMEN: CPT | Performed by: RADIOLOGY

## 2024-09-05 PROCEDURE — 76705 ECHO EXAM OF ABDOMEN: CPT

## 2024-09-05 PROCEDURE — 36415 COLL VENOUS BLD VENIPUNCTURE: CPT

## 2024-09-05 PROCEDURE — 86140 C-REACTIVE PROTEIN: CPT | Performed by: PHYSICIAN ASSISTANT

## 2024-09-05 PROCEDURE — 83690 ASSAY OF LIPASE: CPT | Performed by: STUDENT IN AN ORGANIZED HEALTH CARE EDUCATION/TRAINING PROGRAM

## 2024-09-05 PROCEDURE — 99284 EMERGENCY DEPT VISIT MOD MDM: CPT

## 2024-09-05 PROCEDURE — 85025 COMPLETE CBC W/AUTO DIFF WBC: CPT | Performed by: STUDENT IN AN ORGANIZED HEALTH CARE EDUCATION/TRAINING PROGRAM

## 2024-09-05 PROCEDURE — 63710000001 ONDANSETRON ODT 4 MG TABLET DISPERSIBLE: Performed by: PHYSICIAN ASSISTANT

## 2024-09-05 PROCEDURE — 81003 URINALYSIS AUTO W/O SCOPE: CPT | Performed by: STUDENT IN AN ORGANIZED HEALTH CARE EDUCATION/TRAINING PROGRAM

## 2024-09-05 PROCEDURE — 80053 COMPREHEN METABOLIC PANEL: CPT | Performed by: STUDENT IN AN ORGANIZED HEALTH CARE EDUCATION/TRAINING PROGRAM

## 2024-09-05 RX ORDER — SODIUM CHLORIDE 0.9 % (FLUSH) 0.9 %
10 SYRINGE (ML) INJECTION AS NEEDED
Status: DISCONTINUED | OUTPATIENT
Start: 2024-09-05 | End: 2024-09-05

## 2024-09-05 RX ORDER — ONDANSETRON 4 MG/1
4 TABLET, ORALLY DISINTEGRATING ORAL EVERY 8 HOURS PRN
Qty: 15 TABLET | Refills: 0 | Status: SHIPPED | OUTPATIENT
Start: 2024-09-05

## 2024-09-05 RX ORDER — ONDANSETRON 4 MG/1
4 TABLET, ORALLY DISINTEGRATING ORAL ONCE
Status: COMPLETED | OUTPATIENT
Start: 2024-09-05 | End: 2024-09-05

## 2024-09-05 RX ADMIN — ONDANSETRON 4 MG: 4 TABLET, ORALLY DISINTEGRATING ORAL at 16:42

## 2024-09-05 NOTE — ED PROVIDER NOTES
Subjective   History of Present Illness  29-year-old male with no known past medical history presents to the emergency room with generalized abdominal pain.  He states this pain has been intermittent for years and in the past has been told he has cholelithiasis.  He reports nausea and vomiting which she states began this morning and has not eased.  He denies any fevers, chills, or bodyaches.  He denies any recent illness or exposure to any sick contacts.  He denies any specific aggravating or alleviating factors despite many workups in the past regarding similar symptoms.  He however has not seen gastroenterology as states he eventually just stopped going to be evaluated because he always had a negative workup when evaluated.  Denies any other complaints or concerns at this time.    History provided by:  Patient   used: No        Review of Systems   Constitutional: Negative.  Negative for fever.   HENT: Negative.     Respiratory: Negative.     Cardiovascular: Negative.  Negative for chest pain.   Gastrointestinal:  Positive for abdominal pain.   Endocrine: Negative.    Genitourinary: Negative.  Negative for dysuria.   Skin: Negative.    Neurological: Negative.    Psychiatric/Behavioral: Negative.     All other systems reviewed and are negative.      No past medical history on file.    Allergies   Allergen Reactions    Bactrim [Sulfamethoxazole-Trimethoprim] Other (See Comments)     Nose bleeds    Ceclor [Cefaclor] Other (See Comments)     Nose bleeds       No past surgical history on file.    No family history on file.    Social History     Socioeconomic History    Marital status:            Objective   Physical Exam  Vitals and nursing note reviewed.   Constitutional:       General: He is not in acute distress.     Appearance: He is well-developed. He is not diaphoretic.   HENT:      Head: Normocephalic and atraumatic.      Right Ear: External ear normal.      Left Ear: External ear  normal.      Nose: Nose normal.   Eyes:      Conjunctiva/sclera: Conjunctivae normal.      Pupils: Pupils are equal, round, and reactive to light.   Neck:      Vascular: No JVD.      Trachea: No tracheal deviation.   Cardiovascular:      Rate and Rhythm: Normal rate and regular rhythm.      Heart sounds: Normal heart sounds. No murmur heard.  Pulmonary:      Effort: Pulmonary effort is normal. No respiratory distress.      Breath sounds: Normal breath sounds. No wheezing.   Abdominal:      General: Bowel sounds are normal.      Palpations: Abdomen is soft.      Tenderness: There is no abdominal tenderness.   Musculoskeletal:         General: No deformity. Normal range of motion.      Cervical back: Normal range of motion and neck supple.   Skin:     General: Skin is warm and dry.      Coloration: Skin is not pale.      Findings: No erythema or rash.   Neurological:      Mental Status: He is alert and oriented to person, place, and time.      Cranial Nerves: No cranial nerve deficit.   Psychiatric:         Behavior: Behavior normal.         Thought Content: Thought content normal.         Procedures           ED Course  ED Course as of 09/05/24 1757   Thu Sep 05, 2024   [address] [place] [address] Gallbladder [TK]      ED Course User Index  [TK] Juliet Collins PA-C                                   Results for orders placed or performed during the hospital encounter of 09/05/24   Comprehensive Metabolic Panel    Specimen: Blood   Result Value Ref Range    Glucose 100 (H) 65 - 99 mg/dL    BUN 14 6 - 20 mg/dL    Creatinine 0.99 0.76 - 1.27 mg/dL    Sodium 142 136 - 145 mmol/L    Potassium 4.5 3.5 - 5.2 mmol/L    Chloride 104 98 - 107 mmol/L    CO2 27.8 22.0 - 29.0 mmol/L    Calcium 9.8 8.6 - 10.5 mg/dL    Total Protein 7.7 6.0 - 8.5 g/dL    Albumin 4.9 3.5 - 5.2 g/dL    ALT (SGPT) 17 1 - 41 U/L    AST (SGOT) 15 1 - 40 U/L    Alkaline Phosphatase 48 39 - 117 U/L    Total Bilirubin 0.6 0.0 - 1.2 mg/dL    Globulin 2.8 gm/dL    A/G  Ratio 1.8 g/dL    BUN/Creatinine Ratio 14.1 7.0 - 25.0    Anion Gap 10.2 5.0 - 15.0 mmol/L    eGFR 106.4 >60.0 mL/min/1.73   Lipase    Specimen: Blood   Result Value Ref Range    Lipase 18 13 - 60 U/L   Urinalysis With Microscopic If Indicated (No Culture) - Urine, Clean Catch    Specimen: Urine, Clean Catch   Result Value Ref Range    Color, UA Yellow Yellow, Straw    Appearance, UA Clear Clear    pH, UA 7.5 5.0 - 8.0    Specific Gravity, UA 1.023 1.005 - 1.030    Glucose, UA Negative Negative    Ketones, UA 15 mg/dL (1+) (A) Negative    Bilirubin, UA Negative Negative    Blood, UA Negative Negative    Protein, UA Trace (A) Negative    Leuk Esterase, UA Negative Negative    Nitrite, UA Negative Negative    Urobilinogen, UA 0.2 E.U./dL 0.2 - 1.0 E.U./dL   CBC Auto Differential    Specimen: Blood   Result Value Ref Range    WBC 6.09 3.40 - 10.80 10*3/mm3    RBC 5.15 4.14 - 5.80 10*6/mm3    Hemoglobin 14.8 13.0 - 17.7 g/dL    Hematocrit 44.8 37.5 - 51.0 %    MCV 87.0 79.0 - 97.0 fL    MCH 28.7 26.6 - 33.0 pg    MCHC 33.0 31.5 - 35.7 g/dL    RDW 12.3 12.3 - 15.4 %    RDW-SD 39.5 37.0 - 54.0 fl    MPV 10.5 6.0 - 12.0 fL    Platelets 204 140 - 450 10*3/mm3    Neutrophil % 62.6 42.7 - 76.0 %    Lymphocyte % 29.7 19.6 - 45.3 %    Monocyte % 5.7 5.0 - 12.0 %    Eosinophil % 0.3 0.3 - 6.2 %    Basophil % 1.5 0.0 - 1.5 %    Immature Grans % 0.2 0.0 - 0.5 %    Neutrophils, Absolute 3.81 1.70 - 7.00 10*3/mm3    Lymphocytes, Absolute 1.81 0.70 - 3.10 10*3/mm3    Monocytes, Absolute 0.35 0.10 - 0.90 10*3/mm3    Eosinophils, Absolute 0.02 0.00 - 0.40 10*3/mm3    Basophils, Absolute 0.09 0.00 - 0.20 10*3/mm3    Immature Grans, Absolute 0.01 0.00 - 0.05 10*3/mm3    nRBC 0.0 0.0 - 0.2 /100 WBC   C-reactive Protein    Specimen: Blood   Result Value Ref Range    C-Reactive Protein <0.30 0.00 - 0.50 mg/dL   Green Top (Gel)   Result Value Ref Range    Extra Tube Hold for add-ons.    Lavender Top   Result Value Ref Range    Extra Tube  hold for add-on    Gold Top - SST   Result Value Ref Range    Extra Tube Hold for add-ons.    Light Blue Top   Result Value Ref Range    Extra Tube Hold for add-ons.        US Gallbladder   Final Result     Unremarkable exam.           This report was finalized on 9/5/2024 3:53 PM by Dr. Nasir Larson MD.                        Medical Decision Making  29-year-old male with no known past medical history presents to the emergency room with generalized abdominal pain.  He states this pain has been intermittent for years and in the past has been told he has cholelithiasis.  He reports nausea and vomiting which she states began this morning and has not eased.  He denies any fevers, chills, or bodyaches.  He denies any recent illness or exposure to any sick contacts.  He denies any specific aggravating or alleviating factors despite many workups in the past regarding similar symptoms.  He however has not seen gastroenterology as states he eventually just stopped going to be evaluated because he always had a negative workup when evaluated.  Denies any other complaints or concerns at this time.    Uncomplicated ED course.  There was no bouts of emesis while in the ED.  Patient had a benign workup.  I have referred him to gastroenterology for further evaluation of his symptoms.  Patient has been instructed to return to the emergency room for new or worsening symptoms.  Patient has been discharged on Zofran symptomatic treatment in the interim.    Problems Addressed:  Nausea and vomiting, unspecified vomiting type: complicated acute illness or injury    Amount and/or Complexity of Data Reviewed  Labs: ordered. Decision-making details documented in ED Course.  Radiology: ordered. Decision-making details documented in ED Course.    Risk  Prescription drug management.        Final diagnoses:   Nausea and vomiting, unspecified vomiting type       ED Disposition  ED Disposition       ED Disposition   Discharge    Condition    Stable    Comment   --               Makayla Bowen MD  [address] [facility] [address]  [place] [id number] [phone]    In 2 days           Medication List        New Prescriptions      ondansetron ODT 4 MG disintegrating tablet  Commonly known as: ZOFRAN-ODT  Place 1 tablet on the tongue Every 8 (Eight) Hours As Needed for Nausea or Vomiting.               Where to Get Your Medications        These medications were sent to [facility] - [place] - [address] [facility] [address] [phone] [facility] [phone] [place] [address] [address], [facility] [address]      Phone: [phone]   ondansetron ODT 4 MG disintegrating tablet            Juliet Collins, PA-C  09/05/24 6776